# Patient Record
Sex: FEMALE | Race: WHITE | NOT HISPANIC OR LATINO | Employment: FULL TIME | URBAN - METROPOLITAN AREA
[De-identification: names, ages, dates, MRNs, and addresses within clinical notes are randomized per-mention and may not be internally consistent; named-entity substitution may affect disease eponyms.]

---

## 2017-01-09 ENCOUNTER — APPOINTMENT (OUTPATIENT)
Dept: LAB | Facility: HOSPITAL | Age: 67
End: 2017-01-09
Attending: INTERNAL MEDICINE
Payer: MEDICARE

## 2017-01-09 ENCOUNTER — TRANSCRIBE ORDERS (OUTPATIENT)
Dept: LAB | Facility: HOSPITAL | Age: 67
End: 2017-01-09

## 2017-01-09 DIAGNOSIS — R91.8 OTHER NONSPECIFIC ABNORMAL FINDING OF LUNG FIELD: ICD-10-CM

## 2017-01-09 LAB
ALBUMIN SERPL BCP-MCNC: 4.1 G/DL (ref 3.5–5)
ALP SERPL-CCNC: 93 U/L (ref 46–116)
ALT SERPL W P-5'-P-CCNC: 32 U/L (ref 12–78)
ANION GAP SERPL CALCULATED.3IONS-SCNC: 7 MMOL/L (ref 4–13)
AST SERPL W P-5'-P-CCNC: 17 U/L (ref 5–45)
BASOPHILS # BLD AUTO: 0.02 THOUSANDS/ΜL (ref 0–0.1)
BASOPHILS NFR BLD AUTO: 0 % (ref 0–1)
BILIRUB SERPL-MCNC: 0.53 MG/DL (ref 0.2–1)
BUN SERPL-MCNC: 13 MG/DL (ref 5–25)
CALCIUM SERPL-MCNC: 9.4 MG/DL (ref 8.3–10.1)
CHLORIDE SERPL-SCNC: 98 MMOL/L (ref 100–108)
CO2 SERPL-SCNC: 28 MMOL/L (ref 21–32)
CREAT SERPL-MCNC: 0.6 MG/DL (ref 0.6–1.3)
EOSINOPHIL # BLD AUTO: 0.06 THOUSAND/ΜL (ref 0–0.61)
EOSINOPHIL NFR BLD AUTO: 1 % (ref 0–6)
ERYTHROCYTE [DISTWIDTH] IN BLOOD BY AUTOMATED COUNT: 13.1 % (ref 11.6–15.1)
GFR SERPL CREATININE-BSD FRML MDRD: >60 ML/MIN/1.73SQ M
GLUCOSE SERPL-MCNC: 93 MG/DL (ref 65–140)
HCT VFR BLD AUTO: 40.9 % (ref 34.8–46.1)
HGB BLD-MCNC: 14 G/DL (ref 11.5–15.4)
LYMPHOCYTES # BLD AUTO: 3.74 THOUSANDS/ΜL (ref 0.6–4.47)
LYMPHOCYTES NFR BLD AUTO: 32 % (ref 14–44)
MCH RBC QN AUTO: 30.1 PG (ref 26.8–34.3)
MCHC RBC AUTO-ENTMCNC: 34.2 G/DL (ref 31.4–37.4)
MCV RBC AUTO: 88 FL (ref 82–98)
MONOCYTES # BLD AUTO: 0.79 THOUSAND/ΜL (ref 0.17–1.22)
MONOCYTES NFR BLD AUTO: 7 % (ref 4–12)
NEUTROPHILS # BLD AUTO: 7.23 THOUSANDS/ΜL (ref 1.85–7.62)
NEUTS SEG NFR BLD AUTO: 60 % (ref 43–75)
NRBC BLD AUTO-RTO: 0 /100 WBCS
PLATELET # BLD AUTO: 336 THOUSANDS/UL (ref 149–390)
PMV BLD AUTO: 8.6 FL (ref 8.9–12.7)
POTASSIUM SERPL-SCNC: 3.6 MMOL/L (ref 3.5–5.3)
PROT SERPL-MCNC: 7.5 G/DL (ref 6.4–8.2)
RBC # BLD AUTO: 4.65 MILLION/UL (ref 3.81–5.12)
SODIUM SERPL-SCNC: 133 MMOL/L (ref 136–145)
WBC # BLD AUTO: 11.85 THOUSAND/UL (ref 4.31–10.16)

## 2017-01-09 PROCEDURE — 85025 COMPLETE CBC W/AUTO DIFF WBC: CPT

## 2017-01-09 PROCEDURE — 80053 COMPREHEN METABOLIC PANEL: CPT

## 2017-01-09 PROCEDURE — 36415 COLL VENOUS BLD VENIPUNCTURE: CPT

## 2017-01-20 ENCOUNTER — ALLSCRIPTS OFFICE VISIT (OUTPATIENT)
Dept: OTHER | Facility: OTHER | Age: 67
End: 2017-01-20

## 2017-01-20 DIAGNOSIS — R91.8 OTHER NONSPECIFIC ABNORMAL FINDING OF LUNG FIELD: ICD-10-CM

## 2017-01-27 ENCOUNTER — HOSPITAL ENCOUNTER (OUTPATIENT)
Dept: RADIOLOGY | Facility: HOSPITAL | Age: 67
Discharge: HOME | End: 2017-01-27
Attending: INTERNAL MEDICINE
Payer: MEDICARE

## 2017-01-27 DIAGNOSIS — R91.8 OTHER NONSPECIFIC ABNORMAL FINDING OF LUNG FIELD: ICD-10-CM

## 2017-01-27 PROCEDURE — 71250 CT THORAX DX C-: CPT

## 2018-01-13 NOTE — RESULT NOTES
Message   Please send result to Dr Lety Lira and also schedule for colonoscopy (not urgent)  thanks  Verified Results  * MRI ABDOMEN W WO CONTRAST [id number] 04:21PM You Erik     Test Name Result Flag Reference   MRI ABDOMEN W [address] (Report)     This is a summary report  The complete report is available in the patient's medical record  If you cannot access the medical record, please contact the sending organization for a detailed fax or copy  MRI OF THE ABDOMEN (LIVER) WITH AND WITHOUT CONTRAST     INDICATION: CT for abdominal pain last month demonstrated periportal edema  COMPARISON: CT abdomen and pelvis from August 11, 2016  TECHNIQUE: The following pulse sequences were obtained on a 1 5 T scanner: Coronal and axial T2 with TE of 90 and 180 respectively, axial T2 with fat saturation, axial FIESTA fat-sat, axial T1-weighted in-and-out-of phase, pre-contrast axial T1 with    fat saturation, post-contrast dynamic axial T1 with fat saturation at 20, 70, and 180 seconds, followed by coronal and 7 minute delayed axial T1 with fat saturation  5 mL of Gadavist gadolinium was administered intravenously without immediate    complication  FINDINGS:     LIVER:    General: Normal in size and contour  No loss of signal on out-of-phase images to suggest hepatic steatosis  Lesions: No cystic or solid lesions identified  No areas of abnormal arterial enhancement  Vasculature: Portal and hepatic veins patent without evidence of thrombosis  Periportal edema resolved  BILIARY TREE: Normal       GALLBLADDER: Contracted  Otherwise normal      PANCREAS: Normal      ADRENAL GLANDS: 1 cm nodule in the left adrenal gland with decreased signal on opposed phase images, typical of an adenoma  Right adrenal gland normal      SPLEEN: Normal      KIDNEYS: 6 mm cortical cyst in upper pole of right kidney  2 2 cm cyst in lower pole of left kidney  No hydronephrosis or ureterectasis       ABDOMINAL CAVITY: No lymphadenopathy or mass  No ascites  BOWEL: Unremarkable MRI appearance  OSSEOUS STRUCTURES: Normal marrow signal and enhancement  No recent fracture or marrow replacing process  EXTRAHEPATIC VASCULAR STRUCTURES: Visualized vasculature is normal      ABDOMINAL WALL: Normal  Incidental note made of small subcutaneous epidermoid or sebaceous cyst in the left side of the back  LUNG BASES: Unremarkable MRI appearance  IMPRESSION:     1  Normal liver  2  1 cm left adrenal adenoma  3  Otherwise unremarkable MRI of the abdomen         Workstation performed: [id number]     Signed by:   Cezar Alves MD   9/13/16

## 2018-01-14 VITALS
DIASTOLIC BLOOD PRESSURE: 80 MMHG | WEIGHT: 98.13 LBS | SYSTOLIC BLOOD PRESSURE: 120 MMHG | RESPIRATION RATE: 14 BRPM | BODY MASS INDEX: 18.53 KG/M2 | HEIGHT: 61 IN | TEMPERATURE: 95.8 F

## 2018-01-18 NOTE — MISCELLANEOUS
Message  GI Reminder Recall [address] [place] [address] [address]:   Date: 10/17/2016   Dear Maikel CODY:     Review of our records shows you are due for the following: EGD and Colonoscopy  Please call the following office to schedule your appointment:   [address] [facility], [address], [address] [facility] [phone]  We look forward to hearing from you!      Sincerely,         Signatures   Electronically signed by : Slim Reynolds, ; Oct 17 2016  4:10PM EST                       (Author)